# Patient Record
Sex: FEMALE | Race: WHITE | NOT HISPANIC OR LATINO | Employment: FULL TIME | URBAN - METROPOLITAN AREA
[De-identification: names, ages, dates, MRNs, and addresses within clinical notes are randomized per-mention and may not be internally consistent; named-entity substitution may affect disease eponyms.]

---

## 2024-03-18 ENCOUNTER — OFFICE VISIT (OUTPATIENT)
Dept: URGENT CARE | Facility: CLINIC | Age: 39
End: 2024-03-18
Payer: COMMERCIAL

## 2024-03-18 VITALS
HEART RATE: 100 BPM | OXYGEN SATURATION: 98 % | WEIGHT: 150 LBS | BODY MASS INDEX: 27.6 KG/M2 | TEMPERATURE: 98.6 F | DIASTOLIC BLOOD PRESSURE: 75 MMHG | RESPIRATION RATE: 16 BRPM | SYSTOLIC BLOOD PRESSURE: 117 MMHG | HEIGHT: 62 IN

## 2024-03-18 DIAGNOSIS — Z20.828 EXPOSURE TO INFLUENZA: Primary | ICD-10-CM

## 2024-03-18 PROCEDURE — 99212 OFFICE O/P EST SF 10 MIN: CPT | Performed by: PHYSICIAN ASSISTANT

## 2024-03-18 RX ORDER — IBUPROFEN 800 MG/1
800 TABLET ORAL EVERY 8 HOURS PRN
Qty: 21 TABLET | Refills: 0 | Status: SHIPPED | OUTPATIENT
Start: 2024-03-18 | End: 2024-03-25

## 2024-03-18 RX ORDER — DEXTROMETHORPHAN HYDROBROMIDE, GUAIFENESIN AND PSEUDOEPHEDRINE HYDROCHLORIDE 15; 400; 60 MG/1; MG/1; MG/1
1 TABLET ORAL 3 TIMES DAILY
Qty: 21 TABLET | Refills: 0 | Status: SHIPPED | OUTPATIENT
Start: 2024-03-18 | End: 2024-03-25

## 2024-03-18 RX ORDER — OSELTAMIVIR PHOSPHATE 75 MG/1
75 CAPSULE ORAL 2 TIMES DAILY
Qty: 10 CAPSULE | Refills: 0 | Status: SHIPPED | OUTPATIENT
Start: 2024-03-18 | End: 2024-03-23

## 2024-03-18 NOTE — PROGRESS NOTES
- Acute mildly displaced fracture of the left orbital floor, present on presentation   - Appreciate OMS evaluation and recommendations  Non operative management recommended  - Appreciate Ophthalmology evaluation and recommendations  No intervention or further workup at this time  Recommend outpatient follow-up  - Maintain sinus precautions  - Initiate multimodal analgesic regimen   - Outpatient follow-up with OMS and Ophthalmology  [facility] [place] NOTE:      Name: Quin Rosen, 38 y.o.    CSN:[id number]   MRN:[id number]    PCP: No primary care provider on file.    ALL:  No Known Allergies    History:    Chief Complaint: Fever (FEVER, BODY ACHE, STUFFY NOSE, AND COUGH)    Encounter Date: 3/18/2024  1630hrs    HPI: The history was obtained from the patient. Quin is a 38 y.o. female, who presents with a chief complaint of Fever (FEVER, BODY ACHE, STUFFY NOSE, AND COUGH) her daughter was positive for influenza B last week, she now has symptoms in last 24 hours of headache, sinus pressure, congestion sore throat body aches fever chills.  Patient denies any notable exertional dyspnea, denies any nausea, vomiting or diarrhea, denies any active chest pain, or mucosal bleeding.    PMHx:     Anxiety    Depression    GERD (gastroesophageal reflux disease)    ALL (obstructive sleep apnea)   wears cpap at home         Current Outpatient Medications   Medication Sig Dispense Refill    ibuprofen 800 mg tablet Take 1 tablet (800 mg) by mouth every 8 hours if needed for fever (temp greater than 38.0 C) or moderate pain (4 - 6) for up to 7 days. 21 tablet 0    oseltamivir (Tamiflu) 75 mg capsule Take 1 capsule (75 mg) by mouth 2 times a day for 5 days. 10 capsule 0    pseudoephedrine-DM-guaifenesin (Capmist DM) 60- mg tablet Take 1 tablet by mouth 3 times a day for 7 days. 21 tablet 0     No current facility-administered medications for this visit.         PMSx:     GASTRECTOMY LONGITUDINAL (SLEEVE) ROBOTIC N/A 6/16/2021   Laterality: N/A; Surgeon: Everardo Archer MD; Location: [facility] OR    EGD W/ BX N/A 12/10/2020   Laterality: N/A; Surgeon: Everardo Archer MD; Location: [facility] ENDOSCOPY    CHOLECYSTECTOMY 2013    TUBAL LIGATION   Fam Hx: No family history on file.    SOC. Hx:     Social History     Socioeconomic History    Marital status: Single     Spouse name: Not on file    Number of children: Not on file     Years of education: Not on file    Highest education level: Not on file   Occupational History    Not on file   Tobacco Use    Smoking status: Not on file    Smokeless tobacco: Not on file   Substance and Sexual Activity    Alcohol use: Not on file    Drug use: Not on file    Sexual activity: Not on file   Other Topics Concern    Not on file   Social History Narrative    Not on file     Social Determinants of Health     Financial Resource Strain: Not on file   Food Insecurity: Not on file   Transportation Needs: Not on file   Physical Activity: Not on file   Stress: Not on file   Social Connections: Not on file   Intimate Partner Violence: Not on file   Housing Stability: Not on file         Vitals:    03/18/24 1609   BP: 117/75   Pulse: 100   Resp: 16   Temp: 37 °C (98.6 °F)   SpO2: 98%     68 kg (150 lb)          Physical Exam  Constitutional:       Appearance: Normal appearance. She is normal weight.   HENT:      Head: Normocephalic and atraumatic.      Nose: Congestion present.      Mouth/Throat:      Lips: Pink.      Mouth: Mucous membranes are moist.      Tongue: No lesions.      Pharynx: Uvula midline. No pharyngeal swelling, oropharyngeal exudate, posterior oropharyngeal erythema or uvula swelling.      Tonsils: No tonsillar exudate or tonsillar abscesses.   Eyes:      Extraocular Movements: Extraocular movements intact.   Cardiovascular:      Rate and Rhythm: Regular rhythm. Tachycardia present.   Pulmonary:      Effort: Pulmonary effort is normal.      Breath sounds: Normal breath sounds.   Abdominal:      General: Abdomen is flat.   Musculoskeletal:         General: Normal range of motion.      Cervical back: Full passive range of motion without pain, normal range of motion and neck supple.   Lymphadenopathy:      Cervical: No cervical adenopathy.   Skin:     General: Skin is warm.      Capillary Refill: Capillary refill takes less than 2 seconds.      Findings: No rash.   Neurological:      Mental  Status: She is alert and oriented to person, place, and time.   Psychiatric:         Behavior: Behavior normal.           ____________________________________________________________________    I did personally review Quin's past medical history, surgical history, social history, as well as family history (when relevant).  In this case, I also oversaw the her drug management by reviewing her medication list, allergy list, as well as the medications that I prescribed during the UC course and/or recommended as an out-patient (including possible OTC medications such as acetaminophen, NSAIDs , etc).    After reviewing the items above, I did look at previous medical documentation, such as recent hospitalizations, office visits, and/or recent consultations with PCP/specialist.                          SDOH:   Another factor that I considered in Quin's care was her Social Determinants of Health (SDOH). During this UC encounter, she did not have social determinants of health. Those SDOH influencing Quin's care are: none      _____________________________________________________________________      UC COURSE/MEDICAL DECISION MAKING:    Quin is a 38 y.o., who presents with a working diagnosis of   1. Exposure to influenza     with a differential to include: Influenza, parainfluenza, rhinovirus, adenovirus, metapneumovirus, coronavirus, COVID-19, postnasal drip, strep pharyngitis, GERD, retropharyngeal abscess, tonsillitis, adenitis, seasonal allergies    Supportive care recommended, discussed treatment at bedside to use Tamiflu as a measure to reduce overall symptoms, she was agreed with this plan we discussed side effects associated with this medication and it was sent electronically to her pharmacy.          Artis Morales PA-C   Advanced Practice Provider  [facility]

## 2024-03-18 NOTE — LETTER
March 18, 2024     Patient: Quin Rosen   YOB: 1985   Date of Visit: 3/18/2024       To Whom It May Concern:    It is my medical opinion that Quin Rosen may return to work on 03/20/24 .    If you have any questions or concerns, please don't hesitate to call.         Sincerely,        Artis Morales PA-C    CC: No Recipients